# Patient Record
Sex: FEMALE | Race: WHITE | NOT HISPANIC OR LATINO
[De-identification: names, ages, dates, MRNs, and addresses within clinical notes are randomized per-mention and may not be internally consistent; named-entity substitution may affect disease eponyms.]

---
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
## 2022-01-05 NOTE — PATIENT PROFILE PEDIATRIC. - FUNCTIONAL SCREEN CURRENT LEVEL: TRANSFERRING, MLM
0 = independent Terbinafine Counseling: Patient counseling regarding adverse effects of terbinafine including but not limited to headache, diarrhea, rash, upset stomach, liver function test abnormalities, itching, taste/smell disturbance, nausea, abdominal pain, and flatulence.  There is a rare possibility of liver failure that can occur when taking terbinafine.  The patient understands that a baseline LFT and kidney function test may be required. The patient verbalized understanding of the proper use and possible adverse effects of terbinafine.  All of the patient's questions and concerns were addressed.

## 2022-04-26 NOTE — ED PEDIATRIC NURSE NOTE - NSSUHOSCREENINGYN_ED_ALL_ED
Yes - the patient is able to be screened Purse String (Intermediate) Text: Given the location of the defect and the characteristics of the surrounding skin a purse string intermediate closure was deemed most appropriate.  Undermining was performed circumfirentially around the surgical defect.  A purse string suture was then placed and tightened.

## 2024-09-23 NOTE — PRE-OP CHECKLIST, PEDIATRIC - DENTURES
ADULT BEHAVIORAL HEALTH ASSESSMENT  Génesis Taveras,  Ph.D.   Licensed Clinical Psychologist ([id number])    Visit Date: 9/23/2024   Time of appointment:  10:01a - 11:06a   Time spent with Patient: 65 minutes.   This is patient's second appointment.    Reason for Consult:  Depression and Anxiety     Referring Provider/PCP:    No ref. provider found  Germaine Hdz MD      Pt provided informed consent for the behavioral health program. Discussed with patient model of service to include the limits of confidentiality (i.e. abuse reporting, suicide intervention, etc.) and short-term intervention focused approach.  Pt indicated understanding.    LUISANA Chaidez is a 74 y.o. female who presents for follow up of depression and anxiety.  She has not been seen since her initial intake appointment with the [facility] on 5/29/24.  She admitted that she has continued to experience suicidal ideation with NO intent to harm herself when her pain gets so severe.  However, she reported that a few weeks ago she experienced such a severe level of pain that she felt like she could have seriously ended her life if she had had the means to do so, which \"really scared\" her because this she has never felt this bad before.  She reported that she was able to call her good friend, Zulay, who was able to comfort her and then distract her by talking about other things, which was extremely helpful.  She reported that what she experienced that day still scares her, but it has not happened since then.  She reported that her son also purchased her a very good massager and this has been helping to ease some of her pain since this incident happened.  Kaylynn described her ongoing frustrating with her healthcare providers regarding managing her pain.  She stated, \"I'm not a pill seeker, I never have been, but I just want to feel better.\"  She reported that she feels like she has been treated like an \"addict,\" which has been upsetting and insulting because she  no